# Patient Record
Sex: FEMALE
[De-identification: names, ages, dates, MRNs, and addresses within clinical notes are randomized per-mention and may not be internally consistent; named-entity substitution may affect disease eponyms.]

---

## 2021-02-23 ENCOUNTER — HOSPITAL ENCOUNTER (EMERGENCY)
Dept: HOSPITAL 53 - M ED | Age: 31
LOS: 1 days | Discharge: HOME | End: 2021-02-24
Payer: COMMERCIAL

## 2021-02-23 VITALS — WEIGHT: 147.71 LBS | BODY MASS INDEX: 27.18 KG/M2 | HEIGHT: 62 IN

## 2021-02-23 DIAGNOSIS — N83.291: Primary | ICD-10-CM

## 2021-02-23 LAB
ALBUMIN SERPL BCG-MCNC: 4.1 GM/DL (ref 3.2–5.2)
ALT SERPL W P-5'-P-CCNC: 25 U/L (ref 12–78)
B-HCG SERPL QL: NEGATIVE
BASOPHILS # BLD AUTO: 0.1 10^3/UL (ref 0–0.2)
BASOPHILS NFR BLD AUTO: 0.5 % (ref 0–1)
BILIRUB CONJ SERPL-MCNC: < 0.1 MG/DL (ref 0–0.2)
BILIRUB SERPL-MCNC: 0.2 MG/DL (ref 0.2–1)
BUN SERPL-MCNC: 9 MG/DL (ref 7–18)
CALCIUM SERPL-MCNC: 9.1 MG/DL (ref 8.5–10.1)
CHLORIDE SERPL-SCNC: 106 MEQ/L (ref 98–107)
CO2 SERPL-SCNC: 25 MEQ/L (ref 21–32)
CREAT SERPL-MCNC: 0.91 MG/DL (ref 0.55–1.3)
EOSINOPHIL # BLD AUTO: 0.6 10^3/UL (ref 0–0.5)
EOSINOPHIL NFR BLD AUTO: 4.1 % (ref 0–3)
GFR SERPL CREATININE-BSD FRML MDRD: > 60 ML/MIN/{1.73_M2} (ref 60–?)
GLUCOSE SERPL-MCNC: 87 MG/DL (ref 70–100)
HCT VFR BLD AUTO: 45.5 % (ref 36–47)
HGB BLD-MCNC: 14.3 G/DL (ref 12–15.5)
INR PPP: 0.92
LIPASE SERPL-CCNC: 99 U/L (ref 73–393)
LYMPHOCYTES # BLD AUTO: 3.2 10^3/UL (ref 1.5–5)
LYMPHOCYTES NFR BLD AUTO: 21.5 % (ref 24–44)
MCH RBC QN AUTO: 27.4 PG (ref 27–33)
MCHC RBC AUTO-ENTMCNC: 31.4 G/DL (ref 32–36.5)
MCV RBC AUTO: 87.3 FL (ref 80–96)
MONOCYTES # BLD AUTO: 1.1 10^3/UL (ref 0–0.8)
MONOCYTES NFR BLD AUTO: 7.5 % (ref 2–8)
NEUTROPHILS # BLD AUTO: 9.9 10^3/UL (ref 1.5–8.5)
NEUTROPHILS NFR BLD AUTO: 66.1 % (ref 36–66)
PLATELET # BLD AUTO: 484 10^3/UL (ref 150–450)
POTASSIUM SERPL-SCNC: 4 MEQ/L (ref 3.5–5.1)
PROT SERPL-MCNC: 9.2 GM/DL (ref 6.4–8.2)
PROTHROMBIN TIME: 12.5 SECONDS (ref 12.5–14.3)
RBC # BLD AUTO: 5.21 10^6/UL (ref 4–5.4)
SODIUM SERPL-SCNC: 138 MEQ/L (ref 136–145)
WBC # BLD AUTO: 15 10^3/UL (ref 4–10)

## 2021-02-23 PROCEDURE — 83690 ASSAY OF LIPASE: CPT

## 2021-02-23 PROCEDURE — 85610 PROTHROMBIN TIME: CPT

## 2021-02-23 PROCEDURE — 85025 COMPLETE CBC W/AUTO DIFF WBC: CPT

## 2021-02-23 PROCEDURE — 74176 CT ABD & PELVIS W/O CONTRAST: CPT

## 2021-02-23 PROCEDURE — 99284 EMERGENCY DEPT VISIT MOD MDM: CPT

## 2021-02-23 PROCEDURE — 96374 THER/PROPH/DIAG INJ IV PUSH: CPT

## 2021-02-23 PROCEDURE — 80048 BASIC METABOLIC PNL TOTAL CA: CPT

## 2021-02-23 PROCEDURE — 81001 URINALYSIS AUTO W/SCOPE: CPT

## 2021-02-23 PROCEDURE — 87505 NFCT AGENT DETECTION GI: CPT

## 2021-02-23 PROCEDURE — 84703 CHORIONIC GONADOTROPIN ASSAY: CPT

## 2021-02-23 PROCEDURE — 80076 HEPATIC FUNCTION PANEL: CPT

## 2021-02-23 NOTE — CCD
Continuity of Care Document (CCD)

                             Created on: 12/10/2020



Chaya Dominguez

External Reference #: [id number]

: 1990

Sex: Female



Demographics





                          Address                   [address] [place], [place] [id number]

 

                          Home Phone                [phone]

 

                          Preferred Language        Unknown

 

                          Marital Status            Unknown

 

                          Mosque Affiliation     Unknown

 

                          Race                      Unknown

 

                          Ethnic Group              Declined to Specify/Unknown





Author





                          Chaya Antonio M.D

 

                          Organization              Unknown

 

                          Address                   [address] [place] [address]  [address]



 

                          Phone                     [phone]







Problems





                    Active Problems     Provider            Date

 

                    Seizure             Rachel Melton M.D.  Onset: 10/08/2020







Social History





                Type            Date            Description     Comments

 

                Birth Sex                       Unknown          







Allergies, Adverse Reactions, Alerts





                                        Description

 

                                        No Known Drug Allergies







Medications





                                        Description

 

                                        No Active Medications







Immunizations





                                        Description

 

                                        No Information Available







Vital Signs





                Date            Vital           Result          Comment

 

                10/08/2020  1:10pm Height          62 inches       5'2"

 

                    Weight              140.00 lb            

 

                    BMI (Body Mass Index) 25.6 kg/m2           

 

                    Ideal Body Weight   110 lb               







Results





                                        Description

 

                                        No Information Available







Procedures





                Date            Code            Description     Status

 

                2020      17488           EEG Recording Awake & Asleep Com

pleted

 

                2020      37544           EEG Recording Awake & Asleep Com

pleted

 

                10/26/2020      04535           MRI Brain W/O Contrast Completed

 

                10/26/2020      08838           MRI Brain W/O Contrast Completed

 

                10/26/2020      18359           Magnetic Resonance Angiography N

stella W/O Contrast Materials 

Completed

 

                10/26/2020      52138           Magnetic Resonance Angiography N

stella W/O Contrast Materials 

Completed

 

                10/26/2020      65201           Magnetic Resonance Angiogtaphy H

ead W/O Contrast Material(S) 

Completed

 

                10/26/2020      64120           Magnetic Resonance Angiogtaphy H

ead W/O Contrast Material(S) 

Completed







[employer] Devices





                                        Description

 

                                        No Information Available







Encounters





           Type       Date       Location   Provider   Dx         Diagnosis

 

             Office Visit 10/08/2020 12:30p Main office - [place] Rachel Melton M.D. G40.89

                                        Other seizures







Assessments





                Date            Code            Description     Provider

 

                2020      G40.89          Other seizures  ELE Holder

 

                2020      G40.89          Other seizures  EEG

 

                10/26/2020      G40.89          Other seizures  KELSI Horton

 

                10/26/2020      G40.89          Other seizures  MRI

 

                10/08/2020      G40.89          Other seizures  ELE Holder







Plan of Treatment





No Information Available



Functional Status





                                        Description

 

                                        No Information Available







Mental Status





                                        Description

 

                                        No Information Available







Referrals





                Refer to      Reason for Referral Status          Appt Date

 

                                                Created         

 

                                          

 

                                                Created

## 2021-02-23 NOTE — CCD
Continuity of Care Document (CCD)

                             Created on: 12/15/2020



Chaya Dominguez

External Reference #: [id number]

: 1990

Sex: Female



Demographics





                          Address                   [address] [place], [place] [address]

 

                          Home Phone                [phone]

 

                          Preferred Language        Unknown

 

                          Marital Status            Unknown

 

                          Rastafari Affiliation     Unknown

 

                          Race                      Unknown

 

                          Ethnic Group              Declined to Specify/Unknown





Author





                          Author                    Chaya MELTON M.D

 

                          Organization              Unknown

 

                          Address                   [address] [place] [address]  [address]



 

                          Phone                     [phone]







Problems





                    Active Problems     Provider            Date

 

                    Seizure             Rachel Melton M.D.  Onset: 10/08/2020







Social History





                Type            Date            Description     Comments

 

                Birth Sex                       Unknown          







Allergies, Adverse Reactions, Alerts





                                        Description

 

                                        No Known Drug Allergies







Medications





                                        Description

 

                                        No Active Medications







Immunizations





                                        Description

 

                                        No Information Available







Vital Signs





                Date            Vital           Result          Comment

 

                10/08/2020  1:10pm Height          62 inches       5'2"

 

                    Weight              140.00 lb            

 

                    BMI (Body Mass Index) 25.6 kg/m2           

 

                    Ideal Body Weight   110 lb               







Results





                                        Description

 

                                        No Information Available







Procedures





                Date            Code            Description     Status

 

                2020      90674           EEG Recording Awake & Asleep Com

pleted

 

                2020      48887           EEG Recording Awake & Asleep Com

pleted

 

                10/26/2020      07301           MRI Brain W/O Contrast Completed

 

                10/26/2020      37087           MRI Brain W/O Contrast Completed

 

                10/26/2020      32486           Magnetic Resonance Angiography N

stella W/O Contrast Materials 

Completed

 

                10/26/2020      32889           Magnetic Resonance Angiography N

stella W/O Contrast Materials 

Completed

 

                10/26/2020      14967           Magnetic Resonance Angiogtaphy H

ead W/O Contrast Material(S) 

Completed

 

                10/26/2020      01000           Magnetic Resonance Angiogtaphy H

ead W/O Contrast Material(S) 

Completed







[employer]





                                        Description

 

                                        No Information Available







Encounters





           Type       Date       Location   Provider   Dx         Diagnosis

 

           Office Visit 12/10/2020  7:45a Main office - [place] Rachel Melton M.D. R55        

Syncope and collapse

 

                          R56.9                     Unspecified convulsions

 

             Office Visit 10/08/2020 12:30p Main office - [place] Rachel Melton M.D. G40.89

                                        Other seizures







Assessments





                Date            Code            Description     Provider

 

                12/10/2020      R55             Syncope and collapse Rachel jean-baptiste M.D.

 

                12/10/2020      R56.9           Unspecified convulsions Rachel mckeon M.D.

 

                2020      G40.89          Other seizures  ELE Holder

 

                2020      G40.89          Other seizures  EEG

 

                10/26/2020      G40.89          Other seizures  KELSI Horton

 

                10/26/2020      G40.89          Other seizures  MRI

 

                10/08/2020      G40.89          Other seizures  ELE Holder







Plan of Treatment

Future Appointment(s):* 2020  3:15 pm - Rachel Melton M.D. at [facility]

* 2020  8:00 am - Ans/VS at [facility]

* 2020  9:45 am - EEG at [facility]





Functional Status





                                        Description

 

                                        No Information Available







Mental Status





                                        Description

 

                                        No Information Available







Referrals





                Refer to      Reason for Referral Status          Appt Date

 

                                                Created         

 

                                          

 

                                                Created

## 2021-02-23 NOTE — REPVR
PROCEDURE INFORMATION: 

Exam: CT Abdomen And Pelvis Without Contrast 

Exam date and time: 2/23/2021 10:22 PM 

Age: 31 years old 

Clinical indication: Abdominal pain; Generalized 



TECHNIQUE: 

Imaging protocol: Computed tomography of the abdomen and pelvis without 

contrast. 

Radiation optimization: All CT scans at this facility use at least one of these 

dose optimization techniques: automated exposure control; mA and/or kV 

adjustment per patient size (includes targeted exams where dose is matched to 

clinical indication); or iterative reconstruction. 



COMPARISON: 

No relevant prior studies available. 



FINDINGS: 

Lungs: The imaged portions of the lung bases are clear. The lungs were not 

fully imaged. 

Heart: No cardiomegaly or pericardial effusion. 



Liver: Unremarkable. No liver lesion is identified. The contour of the liver is 

smooth. No hepatomegaly is noted. 

Gallbladder and bile ducts: No calcified gallstones are noted. No gallbladder 

wall thickening, pericholecystic fluid, or pericholecystic inflammatory changes 

are identified. No dilation of the bile ducts is noted. No calcified stones are 

seen in the common bile duct. 

Pancreas: Unremarkable. No dilation of the main pancreatic duct is noted. There 

is no inflammatory fat stranding around the pancreas to suggest acute 

pancreatitis. 

Spleen: Unremarkable. No splenomegaly is noted. 

Adrenal glands: Normal. No adrenal mass is noted. 

Kidneys and ureters: The kidneys are unremarkable. No renal lesion is 

identified. No calculi are seen in the kidneys or ureters. There is no 

hydronephrosis or hydroureter. 

Stomach and bowel: The stomach is decompressed, limiting its optimal 

evaluation. There is no evidence for a bowel obstruction, diverticulosis, 

diverticulitis, colitis, perforated viscus, pneumatosis intestinalis, 

intussusception, or volvulus. There is liquid feces in the colon, which will 

lead to diarrhea. 

Appendix: Normal. There is no evidence for appendicitis. 



Intraperitoneal space: No free air. No ascites. No abscess. 

Retroperitoneal space: No fluid collection. No mass. 

Vasculature: The abdominal aorta is normal in caliber. 

Lymph nodes: No enlarged lymph nodes. 

Urinary bladder: The urinary bladder is decompressed, limiting its optimal 

evaluation. No stones are seen in the bladder. 

Reproductive: There is a 5 cm right ovarian mass with soft tissue and fatty 

components, fluid, and calcifications, which represents a right ovarian dermoid 

cyst. The left ovary is unremarkable. The uterus is anteverted. 

Bones/joints: There is no fracture or dislocation. No suspicious osteolytic or 

osteoblastic lesion. 

Soft tissues: Unremarkable. No hernia. No soft tissue fluid collection. 



IMPRESSION: 

1. 5 cm right ovarian dermoid cyst. Gynecology consultation is suggested for 

further management. 

2. Liquid feces in the colon, which will lead to diarrhea. 



Electronically signed by: Lupillo Brower On 02/23/2021  23:09:44 PM

## 2021-02-23 NOTE — CCD
Summarization Of Episode

                             Created on: 2021



JOSE CRUZ BROWN

External Reference #: [id number]

: 1990

Sex: Female



Demographics





                          Address                   [address] [place]  [address]

 

                          Home Phone                [phone]

 

                          Preferred Language        English

 

                          Marital Status            

 

                          Sikh Affiliation     Religion

 

                          Race                       or 

 

                          Ethnic Group              YES





Author





                          Author                    [facility]

 

                          Organization              [facility]

 

                          Address                   Unknown

 

                          Phone                     Unavailable







Support





                Name            Relationship    Address         Phone

 

                    [facility]             Next Of Kin         [address]

[place]

[place]  [address]                    Unavailable







Care Team Providers





                    Care Team Member Name Role                Phone

 

                    MIRELLA COHEN MD   Unavailable         Unavailable

 

                    NOEL,  MIRELLA CHANG   Unavailable         Unavailable

 

                    NOEL,  MIRELLA MD   Unavailable         Unavailable

 

                    NOEL,  MIRELLA MD   Unavailable         Unavailable

 

                    NOEL,  MIRELLA MD   Unavailable         Unavailable

 

                    NOEL,  MIRELLA MD   Unavailable         Unavailable

 

                    NOEL,  MIRELLA MD   Unavailable         Unavailable

 

                    NOEL,  MIRELLA MD   Unavailable         Unavailable

 

                    NOEL,  MIRELLA MD   Unavailable         Unavailable

 

                    NOEL,  MIRELLA MD   Unavailable         Unavailable

 

                    NOEL,  MIRELLA MD   Unavailable         Unavailable

 

                    NOEL,  MIRELLA MD   Unavailable         Unavailable

 

                    NOEL,  MIRELLA MD   Unavailable         Unavailable

 

                    NOEL,  MIRELLA MD   Unavailable         Unavailable

 

                    NOEL,  MIRELLA MD   Unavailable         Unavailable

 

                    NOEL,  MIRELLA MD   Unavailable         Unavailable

 

                    NOEL,  MIRELLA MD   Unavailable         Unavailable

 

                    NOEL,  MIRELLA MD   Unavailable         Unavailable

 

                    NOEL,  MIRELLA MD   Unavailable         Unavailable

 

                    NOEL,  MIRELLA MD   Unavailable         Unavailable

 

                    NOEL,  MIRELLA MD   Unavailable         Unavailable

 

                    NOEL,  MIRELLA MD   Unavailable         Unavailable

 

                    NOEL,  MIRELLA MD   Unavailable         Unavailable

 

                    NOEL,  MIRELLA MD   Unavailable         Unavailable

 

                    NOEL,  MIRELLA MD   Unavailable         Unavailable

 

                    NOEL,  MIRELLA MD   Unavailable         Unavailable

 

                    NOEL,  MIRELLA MD   Unavailable         Unavailable

 

                    NOEL,  MIRELLA MD   Unavailable         Unavailable

 

                    NOEL,  MIRELLA MD   Unavailable         Unavailable

 

                    NOEL,  MIRELLA MD   Unavailable         Unavailable

 

                    NOEL,  MIRELLA CHANG   Unavailable         Unavailable

 

                    NOEL,  MIRELLA MD   Unavailable         Unavailable

 

                    NOEL,  MIRELLA CHANG   Unavailable         Unavailable

 

                    NOEL,  MIRELLA CHANG   Unavailable         Unavailable

 

                    NOEL,  MIRELLA CHANG   Unavailable         Unavailable

 

                    NOEL,  MIRELLA CHANG   Unavailable         Unavailable

 

                    NOEL,  MIRELLA CHANG   Unavailable         Unavailable

 

                    NOEL,  MIRELLA MD   Unavailable         Unavailable

 

                    NOEL,  MIRELLA CHANG   Unavailable         Unavailable

 

                    NOEL,  MIRELLA CHANG   Unavailable         Unavailable



                                  



Re-disclosure Warning

          The records that you are about to access may contain information from 
federally-assisted alcohol or drug abuse programs. If such information is 
present, then the following federally mandated warning applies: This information
has been disclosed to you from records protected by federal confidentiality 
rules (42 CFR part 2). The federal rules prohibit you from making any further 
disclosure of this information unless further disclosure is expressly permitted 
by the written consent of the person to whom it pertains or as otherwise 
permitted by 42 CFR part 2. A general authorization for the release of medical 
or other information is NOT sufficient for this purpose. The Federal rules 
restrict any use of the information to criminally investigate or prosecute any 
alcohol or drug abuse patient.The records that you are about to access may 
contain highly sensitive health information, the redisclosure of which is 
protected by Article 27-F of the [facility] Public Health law. If you 
continue you may have access to information: Regarding HIV / AIDS; Provided by 
facilities licensed or operated by the [facility] Office of Mental Health; 
or Provided by the [facility] Office for People With Developmental 
Disabilities. If such information is present, then the following [place] 
mandated warning applies: This information has been disclosed to you from 
confidential records which are protected by state law. State law prohibits you 
from making any further disclosure of this information without the specific 
written consent of the person to whom it pertains, or as otherwise permitted by 
law. Any unauthorized further disclosure in violation of state law may result in
a fine or USP sentence or both. A general authorization for the release of 
medical or other information is NOT sufficient authorization for further disc
losure.                                                                         
    



Encounters

          



           Encounter  Providers  Location   Date       Indications Data Source(s

)

 

                Outpatient      Attender: MIRELLA COHEN MD [facility] 12/10/2020 06:45:00

AM EST                                              [facility]

 

                Outpatient      Attender: MIRELLA COHEN MD [facility] 10/08/2020 12:30:00

PM EDT                                              [facility]



                                                                              



Insurance Providers

          



             Payer name   Policy type / Coverage type Policy ID    Covered party

 ID Covered 

party's relationship to vazquez Policy Vazquez             Plan Information

 

          [facility] [employer]           [id number]                        

     [id number]



                                                                                
       



Problems, Conditions, and Diagnoses

          



           Code       Display Name Description Problem Type Effective Dates Data

 Source(s)

 

           [id number]   Seizure    Seizure    Problem    10/08/2020 12:00:00 AM ED

T [facility] ([facility])



                                                                                
                 



Surgeries/Procedures

          



             Procedure    Description  Date         Indications  Data Source(s)

 

             EEG Complete STD Phys/QHP>60 HR<84 HR W/O Video              2021 12:00:00 AM EST              

[facility] ([facility]

 

             EEG Complete STD Phys/QHP>60 HR<84 HR W/O Video              2021 12:00:00 AM EST              

[facility] ([facility]

 

             TSTG ANS FUNCJ CARDIOVAGAL INNERVAJ PARASYMP              

0 12:00:00 AM EST              

[facility] ([facility]

 

             TSTG ANS FUNCJ CARDIOVAGAL INNERVAJ PARASYMP              

0 12:00:00 AM EST              

[facility] ([facility]

 

             TESTING AUTONOMIC NERVOUS SYSTEM FUNCTION              2020 1

2:00:00 AM EST              [facility] 

([facility]

 

             TESTING AUTONOMIC NERVOUS SYSTEM FUNCTION              2020 1

2:00:00 AM EST              [facility] 

([facility]

 

             ELECTROENCEPHALOGRAM W/REC AWAKE&ASLEEP              2020 12:

00:00 AM EST              [facility] 

([facility]

 

             ELECTROENCEPHALOGRAM W/REC AWAKE&ASLEEP              2020 12:

00:00 AM EST              [facility] 

([facility]

 

                    Magnetic Resonance Angiogtaphy Head W/O Contrast Material(S)

                     10/26/2020 

12:00:00 AM EDT                                     [facility]

 

                    Magnetic Resonance Angiogtaphy Head W/O Contrast Material(S)

                     10/26/2020 

12:00:00 AM EDT                                     [facility]

 

                    Magnetic Resonance Angiography Neck W/O Contrast Materials  

                   10/26/2020 12:00:00

AM EDT                                              [facility]

 

                    Magnetic Resonance Angiography Neck W/O Contrast Materials  

                   10/26/2020 12:00:00

AM EDT                                              [facility]

 

             MRI BRAIN BRAIN STEM W/O CONTRAST MATERIAL              10/26/2020 

12:00:00 AM EDT              [facility]

([facility]

 

             MRI BRAIN BRAIN STEM W/O CONTRAST MATERIAL              10/26/2020 

12:00:00 AM EDT              [facility]

([facility]



                                                                                
                                                                                
                                                                  



Vital Signs

          



                    ID                  Date                Data Source

 

                    UNK                                      









           Name       Value      Range      Interpretation Code Description Data

 Source(s)

 

           Ideal body weight 110 [lb_av]                       110 [lb_av] [facility]

T ([facility] 

)

 

           Body mass index (BMI) [Ratio] 25.6 kg/m2                       25.6 k

g/m2 BLAYNE ([facility] )

 

           Body weight 140.00 [lb_av]                       140.00 [lb_av] GURPREET DOTSON ([facility] 

)

 

           Body height 62 [in_i]                        62 [in_i]  BLAY[facility] ([facility], )

 

                                        5'2"

## 2021-02-23 NOTE — CCD
Continuity of Care Document (CCD)

                             Created on: 2020



Chaya Dominguez

External Reference #: [id number]

: 1990

Sex: Female



Demographics





                          Address                   [address] [place] [address]

 

                          Home Phone                [phone]

 

                          Preferred Language        Unknown

 

                          Marital Status            Unknown

 

                          Druze Affiliation     Unknown

 

                          Race                      Unknown

 

                          Ethnic Group              Declined to Specify/Unknown





Author





                          Author                    [facility]

 

                          Organization              Unknown

 

                          Address                   [address] [place] [address]



 

                          Phone                     [phone]







Problems





                    Active Problems     Provider            Date

 

                    Seizure             Rachel Melton M.D.  Onset: 10/08/2020







Social History





                Type            Date            Description     Comments

 

                Birth Sex                       Unknown          







Allergies, Adverse Reactions, Alerts





                                        Description

 

                                        No Known Drug Allergies







Medications





                                        Description

 

                                        No Active Medications







Immunizations





                                        Description

 

                                        No Information Available







Vital Signs





                Date            Vital           Result          Comment

 

                10/08/2020  1:10pm Height          62 inches       5'2"

 

                    Weight              140.00 lb            

 

                    BMI (Body Mass Index) 25.6 kg/m2           

 

                    Ideal Body Weight   110 lb               







Results





                                        Description

 

                                        No Information Available







Procedures





                Date            Code            Description     Status

 

                2020      01627           EEG Recording Awake & Asleep Com

pleted

 

                2020      73840           EEG Recording Awake & Asleep Com

pleted

 

                10/26/2020      87333           MRI Brain W/O Contrast Completed

 

                10/26/2020      73174           MRI Brain W/O Contrast Completed

 

                10/26/2020      55211           Magnetic Resonance Angiography N

stella W/O Contrast Materials 

Completed

 

                10/26/2020      62795           Magnetic Resonance Angiography N

stella W/O Contrast Materials 

Completed

 

                10/26/2020      41900           Magnetic Resonance Angiogtaphy H

ead W/O Contrast Material(S) 

Completed

 

                10/26/2020      37108           Magnetic Resonance Angiogtaphy H

ead W/O Contrast Material(S) 

Completed







[employer]





                                        Description

 

                                        No Information Available







Encounters





           Type       Date       Location   Provider   Dx         Diagnosis

 

           Office Visit 12/10/2020  7:45a [facility] [place] R55        

Syncope and collapse

 

                          R56.9                     Unspecified convulsions

 

             Office Visit 10/08/2020 12:30p [facility] [place] M[place] G40.89

                                        Other seizures







Assessments





                Date            Code            Description     Provider

 

                12/10/2020      R55             Syncope and collapse Rachel jean-baptiste M.D.

 

                12/10/2020      R56.9           Unspecified convulsions Rachel mckeon M.D.

 

                2020      G40.89          Other seizures  ELE Holder

 

                2020      G40.89          Other seizures  EEG

 

                10/26/2020      G40.89          Other seizures  KELSI Horton

 

                10/26/2020      G40.89          Other seizures  MRI

 

                10/08/2020      G40.89          Other seizures  ELE Holder







Plan of Treatment

Future Appointment(s):* 2020  3:15 pm - Rachel Melton M.D. at [facility]

* 2020  9:45 am - EEG at [facility]





Functional Status





                                        Description

 

                                        No Information Available







Mental Status





                                        Description

 

                                        No Information Available







Referrals





                Refer to      Reason for Referral Status          Appt Date

 

                                                Created         

 

                                          

 

                                                Created

## 2021-02-23 NOTE — CCD
Continuity of Care Document (CCD)

                             Created on: 2020



Chaya Dominguez

External Reference #: [id number]

: 1990

Sex: Female



Demographics





                          Address                   [address] [place] [address]

 

                          Home Phone                [phone]

 

                          Preferred Language        Unknown

 

                          Marital Status            Unknown

 

                          Quaker Affiliation     Unknown

 

                          Race                      Unknown

 

                          Ethnic Group              Declined to Specify/Unknown





Author





                          Author                    [facility]

 

                          Organization              Unknown

 

                          Address                   [address] [place] [address]



 

                          Phone                     [phone]







Problems





                    Active Problems     Provider            Date

 

                    Seizure             Rachel Melton M.D.  Onset: 10/08/2020







Social History





                Type            Date            Description     Comments

 

                Birth Sex                       Unknown          







Allergies, Adverse Reactions, Alerts





                                        Description

 

                                        No Known Drug Allergies







Medications





                                        Description

 

                                        No Active Medications







Immunizations





                                        Description

 

                                        No Information Available







Vital Signs





                Date            Vital           Result          Comment

 

                10/08/2020  1:10pm Height          62 inches       5'2"

 

                    Weight              140.00 lb            

 

                    BMI (Body Mass Index) 25.6 kg/m2           

 

                    Ideal Body Weight   110 lb               







Results





                                        Description

 

                                        No Information Available







Procedures





                Date            Code            Description     Status

 

                2020      63889           Sympathetic Skin Responses Compl

eted

 

                2020      47373           Sympathetic Skin Responses Compl

eted

 

                2020      34607           Test Autonomic Nervous System, C

ardiovagal Innervation 

Completed

 

                2020      80060           Test Autonomic Nervous System, C

ardiovagal Innervation 

Completed

 

                2020      49823           EEG Recording Awake & Asleep Com

pleted

 

                2020      25433           EEG Recording Awake & Asleep Com

pleted

 

                10/26/2020      11925           MRI Brain W/O Contrast Completed

 

                10/26/2020      87067           MRI Brain W/O Contrast Completed

 

                10/26/2020      90527           Magnetic Resonance Angiography N

stella W/O Contrast Materials 

Completed

 

                10/26/2020      45458           Magnetic Resonance Angiography N

stella W/O Contrast Materials 

Completed

 

                10/26/2020      55610           Magnetic Resonance Angiogtaphy H

ead W/O Contrast Material(S) 

Completed

 

                10/26/2020      49305           Magnetic Resonance Angiogtaphy H

ead W/O Contrast Material(S) 

Completed







[facility]





                                        Description

 

                                        No Information Available







Encounters





           Type       Date       Location   Provider   Dx         Diagnosis

 

           Office Visit 12/10/2020  7:45a [facility] Rachel Melton M.D. R55        

Syncope and collapse

 

                          R56.9                     Unspecified convulsions

 

             Office Visit 10/08/2020 12:30p [facility] Rachel Melton M.D. G40.89

                                        Other seizures







Assessments





                Date            Code            Description     Provider

 

                2020      I95.1           Orthostatic hypotension Rachel mckeon M.D.

 

                2020      I95.1           Orthostatic hypotension [facility]

 

                12/10/2020      R55             Syncope and collapse Rachel jean-baptiste M.D.

 

                12/10/2020      R56.9           Unspecified convulsions Rachel mckeon M.D.

 

                2020      G40.89          Other seizures  ELE Holder

 

                2020      G40.89          Other seizures  EEG

 

                10/26/2020      G40.89          Other seizures  KELSI Horton

 

                10/26/2020      G40.89          Other seizures  MRI

 

                10/08/2020      G40.89          Other seizures  ELE Holder







Plan of Treatment

Future Appointment(s):* 2020  3:15 pm - Rachel Melton M.D. at [facility]

* 2020  9:45 am - EEG at [facility]





Functional Status





                                        Description

 

                                        No Information Available







Mental Status





                                        Description

 

                                        No Information Available







Referrals





                Refer to      Reason for Referral Status          Appt Date

 

                                                Created         

 

                                          

 

                                                Created         

 

                                          

 

                                                Created

## 2021-02-23 NOTE — CCD
Continuity of Care Document (CCD)

                             Created on: 2021



Chaya Dominguez

External Reference #: [id number]

: 1990

Sex: Female



Demographics





                          Address                   [address] [place] [address]

 

                          Home Phone                [phone]

 

                          Preferred Language        Unknown

 

                          Marital Status            Unknown

 

                          [employer]ar[employer] Affiliation     Unknown

 

                          Race                      Unknown

 

                          Ethnic Group              Declined to Specify/Unknown





Author





                          Author                    Chaya LUCAS

 

                          Organization              Unknown

 

                          [place] [address]



 

                          Phone                     [phone]







Problems





                    Active Problems     Provider            Date

 

                    Seizure             Rachel Melton M.D.  Onset: 10/08/2020







Social History





                Type            Date            Description     Comments

 

                Birth Sex                       Unknown          







Allergies, Adverse Reactions, Alerts





                                        Description

 

                                        No Known Drug Allergies







Medications





                                        Description

 

                                        No Active Medications







Immunizations





                                        Description

 

                                        No Information Available







Vital Signs





                Date            Vital           Result          Comment

 

                10/08/2020  1:10pm Height          62 inches       5'2"

 

                    Weight              140.00 lb            

 

                    BMI (Body Mass Index) 25.6 kg/m2           

 

                    Ideal Body Weight   110 lb               







Results





                                        Description

 

                                        No Information Available







Procedures





                Date            Code            Description     Status

 

                2021      62308           EEG Complete STD Phys/QHP>60 HR<

84 HR W/O Video Completed

 

                2021      68706           EEG Complete STD Phys/QHP>60 HR<

84 HR W/O Video Completed

 

                2020      95185           Sympathetic Skin Responses Compl

eted

 

                2020      65853           Sympathetic Skin Responses Compl

eted

 

                2020      18942           Test Autonomic Nervous System, C

ardiovagal Innervation 

Completed

 

                2020      91290           Test Autonomic Nervous System, C

ardiovagal Innervation 

Completed

 

                2020      97415           EEG Recording Awake & Asleep Com

pleted

 

                2020      09428           EEG Recording Awake & Asleep Com

pleted

 

                10/26/2020      49417           MRI Brain W/O Contrast Completed

 

                10/26/2020      43957           MRI Brain W/O Contrast Completed

 

                10/26/2020      19534           Magnetic Resonance Angiography N

stella W/O Contrast Materials 

Completed

 

                10/26/2020      57224           Magnetic Resonance Angiography N

stella W/O Contrast Materials 

Completed

 

                10/26/2020      92433           Magnetic Resonance Angiogtaphy H

ead W/O Contrast Material(S) 

Completed

 

                10/26/2020      47561           Magnetic Resonance Angiogtaphy H

ead W/O Contrast Material(S) 

Completed







Medical Devices





                                        Description

 

                                        No Information Available







Encounters





           Type       Date       Location   Provider   Dx         Diagnosis

 

           Office Visit 12/10/2020  7:45a [facility] office - [place] Rachel Melton M.D. R55        

Syncope and collapse

 

                          R56.9                     Unspecified convulsions

 

             Office Visit 10/08/2020 12:30p [facility] Rachel Melton M.D. G40.89

                                        Other seizures







Assessments





                Date            Code            Description     Provider

 

                2021      G40.89          Other seizures  ELE Holder

 

                2021      G40.89          Other seizures  EEG

 

                2020      I95.1           Orthostatic hypotension Rachel mckeon M.D.

 

                2020      I95.1           Orthostatic hypotension Ans/VS

 

                12/10/2020      R55             Syncope and collapse Rachel jean-baptiste M.D.

 

                12/10/2020      R56.9           Unspecified convulsions Rachel mckeon M.D.

 

                2020      G40.89          Other seizures  ELE Holder

 

                2020      G40.89          Other seizures  EEG

 

                10/26/2020      G40.89          Other seizures  KELSI Horton

 

                10/26/2020      G40.89          Other seizures  MRI

 

                10/08/2020      G40.89          Other seizures  ELE Holder







Plan of Treatment

Future Appointment(s):* 2021  3:30 pm - Rachel Melton M.D. at [facility]





Functional Status





                                        Description

 

                                        No Information Available







Mental Status





                                        Description

 

                                        No Information Available







Referrals





                Refer to      Reason for Referral Status          Appt Date

 

                                                Created         

 

                                          

 

                                                Created         

 

                                          

 

                                                Created

## 2021-02-23 NOTE — CCD
Continuity of Care Document (CCD)

                             Created on: 2021



Chaya Dominguez

External Reference #: [id number]

: 1990

Sex: Female



Demographics





                          Address                   [address] [place] [address]

 

                          Home Phone                [phone]

 

                          Preferred Language        Unknown

 

                          Marital Status            Unknown

 

                          Quaker Affiliation     Unknown

 

                          Race                      Unknown

 

                          Ethnic Group              Declined to Specify/Unknown





Author





                          Author                    Chaya LUCAS

 

                          Organization              Unknown

 

                          [place] [address]



 

                          Phone                     [phone]







Problems





                    Active Problems     Provider            Date

 

                    Seizure             Rachel Melton M.D.  Onset: 10/08/2020







Social History





                Type            Date            Description     Comments

 

                Birth Sex                       Unknown          







Allergies, Adverse Reactions, Alerts





                                        Description

 

                                        No Known Drug Allergies







Medications





                                        Description

 

                                        No Active Medications







Immunizations





                                        Description

 

                                        No Information Available







Vital Signs





                Date            Vital           Result          Comment

 

                10/08/2020  1:10pm Height          62 inches       5'2"

 

                    Weight              140.00 lb            

 

                    BMI (Body Mass Index) 25.6 kg/m2           

 

                    Ideal Body Weight   110 lb               







Results





                                        Description

 

                                        No Information Available







Procedures





                Date            Code            Description     Status

 

                2020      00789           Sympathetic Skin Responses Compl

eted

 

                2020      43074           Sympathetic Skin Responses Compl

eted

 

                2020      00616           Test Autonomic Nervous System, C

ardiovagal Innervation 

Completed

 

                2020      38397           Test Autonomic Nervous System, C

ardiovagal Innervation 

Completed

 

                2020      30379           EEG Recording Awake & Asleep Com

pleted

 

                2020      76370           EEG Recording Awake & Asleep Com

pleted

 

                10/26/2020      22335           MRI Brain W/O Contrast Completed

 

                10/26/2020      10391           MRI Brain W/O Contrast Completed

 

                10/26/2020      79974           Magnetic Resonance Angiography N

stella W/O Contrast Materials 

Completed

 

                10/26/2020      34234           Magnetic Resonance Angiography N

stella W/O Contrast Materials 

Completed

 

                10/26/2020      32002           Magnetic Resonance Angiogtaphy H

ead W/O Contrast Material(S) 

Completed

 

                10/26/2020      40727           Magnetic Resonance Angiogtaphy H

ead W/O Contrast Material(S) 

Completed







[employer] Devices





                                        Description

 

                                        No Information Available







Encounters





           Type       Date       Location   Provider   Dx         Diagnosis

 

           Office Visit 12/10/2020  7:45a Main office - [place] Rachel Melton M.D. R55        

Syncope and collapse

 

                          R56.9                     Unspecified convulsions

 

             Office Visit 10/08/2020 12:30p Main office - [place] Rachel Melton M.D. G40.89

                                        Other seizures







Assessments





                Date            Code            Description     Provider

 

                2021      G40.89          Other seizures  EEG

 

                2020      I95.1           Orthostatic hypotension Rachel mckeon M.D.

 

                2020      I95.1           Orthostatic hypotension Ans/VS

 

                12/10/2020      R55             Syncope and collapse Rachel jean-baptiste M.D.

 

                12/10/2020      R56.9           Unspecified convulsions Rachel mckeon M.D.

 

                2020      G40.89          Other seizures  ELE Holder

 

                2020      G40.89          Other seizures  EEG

 

                10/26/2020      G40.89          Other seizures  KELSI Horton

 

                10/26/2020      G40.89          Other seizures  MRI

 

                10/08/2020      G40.89          Other seizures  ELE Holder







Plan of Treatment

Future Appointment(s):* 2021  3:30 pm - Rachel Melton M.D. at Main office 
  [facility]





Functional Status





                                        Description

 

                                        No Information Available







Mental Status





                                        Description

 

                                        No Information Available







Referrals





                Refer to      Reason for Referral Status          Appt Date

 

                                                Created         

 

                                          

 

                                                Created         

 

                                          

 

                                                Created

## 2021-02-24 VITALS — DIASTOLIC BLOOD PRESSURE: 87 MMHG | SYSTOLIC BLOOD PRESSURE: 123 MMHG
